# Patient Record
Sex: FEMALE | Race: BLACK OR AFRICAN AMERICAN | URBAN - METROPOLITAN AREA
[De-identification: names, ages, dates, MRNs, and addresses within clinical notes are randomized per-mention and may not be internally consistent; named-entity substitution may affect disease eponyms.]

---

## 2021-12-16 ENCOUNTER — OFFICE VISIT (OUTPATIENT)
Dept: URBAN - METROPOLITAN AREA CLINIC 105 | Facility: CLINIC | Age: 56
End: 2021-12-16
Payer: COMMERCIAL

## 2021-12-16 ENCOUNTER — DASHBOARD ENCOUNTERS (OUTPATIENT)
Age: 56
End: 2021-12-16

## 2021-12-16 ENCOUNTER — WEB ENCOUNTER (OUTPATIENT)
Dept: URBAN - METROPOLITAN AREA CLINIC 105 | Facility: CLINIC | Age: 56
End: 2021-12-16

## 2021-12-16 VITALS
TEMPERATURE: 96.7 F | DIASTOLIC BLOOD PRESSURE: 113 MMHG | SYSTOLIC BLOOD PRESSURE: 178 MMHG | HEIGHT: 64 IN | WEIGHT: 273.2 LBS | HEART RATE: 75 BPM | BODY MASS INDEX: 46.64 KG/M2

## 2021-12-16 DIAGNOSIS — R10.11 RUQ ABDOMINAL PAIN: ICD-10-CM

## 2021-12-16 DIAGNOSIS — K21.00 GASTROESOPHAGEAL REFLUX DISEASE WITH ESOPHAGITIS WITHOUT HEMORRHAGE: ICD-10-CM

## 2021-12-16 DIAGNOSIS — R16.0 HEPATOMEGALY: ICD-10-CM

## 2021-12-16 DIAGNOSIS — Z80.0 FAMILY HISTORY OF CHOLANGIOCARCINOMA: ICD-10-CM

## 2021-12-16 DIAGNOSIS — G47.33 OSA (OBSTRUCTIVE SLEEP APNEA): ICD-10-CM

## 2021-12-16 DIAGNOSIS — Z80.7 FAMILY HISTORY OF MULTIPLE MYELOMA: ICD-10-CM

## 2021-12-16 DIAGNOSIS — E66.01 MORBID OBESITY: ICD-10-CM

## 2021-12-16 DIAGNOSIS — K76.0 FATTY LIVER: ICD-10-CM

## 2021-12-16 DIAGNOSIS — I10 PRIMARY HYPERTENSION: ICD-10-CM

## 2021-12-16 PROBLEM — 197321007: Status: ACTIVE | Noted: 2021-12-16

## 2021-12-16 PROBLEM — 238136002: Status: ACTIVE | Noted: 2021-12-16

## 2021-12-16 PROBLEM — 78275009: Status: ACTIVE | Noted: 2021-12-16

## 2021-12-16 PROBLEM — 266433003: Status: ACTIVE | Noted: 2021-12-16

## 2021-12-16 PROBLEM — 59621000: Status: ACTIVE | Noted: 2021-12-16

## 2021-12-16 PROCEDURE — 99244 OFF/OP CNSLTJ NEW/EST MOD 40: CPT | Performed by: INTERNAL MEDICINE

## 2021-12-16 PROCEDURE — 95801 SLP STDY UNATND W/ANAL: CPT | Performed by: INTERNAL MEDICINE

## 2021-12-16 PROCEDURE — 95800 SLP STDY UNATTENDED: CPT | Performed by: INTERNAL MEDICINE

## 2021-12-16 PROCEDURE — 99204 OFFICE O/P NEW MOD 45 MIN: CPT | Performed by: INTERNAL MEDICINE

## 2021-12-16 PROCEDURE — 95806 SLEEP STUDY UNATT&RESP EFFT: CPT | Performed by: INTERNAL MEDICINE

## 2021-12-16 PROCEDURE — 95805 MULTIPLE SLEEP LATENCY TEST: CPT | Performed by: INTERNAL MEDICINE

## 2021-12-16 RX ORDER — METFORMIN HYDROCHLORIDE 500 MG/1
1 TABLET WITH A MEAL TABLET, FILM COATED ORAL ONCE A DAY
Status: ACTIVE | COMMUNITY

## 2021-12-16 RX ORDER — ATORVASTATIN CALCIUM 10 MG/1
1 TABLET TABLET, FILM COATED ORAL ONCE A DAY
Status: ACTIVE | COMMUNITY

## 2021-12-16 RX ORDER — ASPIRIN 81 MG/1
1 TABLET TABLET, CHEWABLE ORAL ONCE A DAY
Status: ACTIVE | COMMUNITY

## 2021-12-16 RX ORDER — SEMAGLUTIDE 1.34 MG/ML
AS DIRECTED INJECTION, SOLUTION SUBCUTANEOUS
Status: ACTIVE | COMMUNITY

## 2021-12-16 RX ORDER — CETIRIZINE HYDROCHLORIDE 10 MG/1
1 TABLET TABLET, FILM COATED ORAL ONCE A DAY
Status: ACTIVE | COMMUNITY

## 2021-12-16 NOTE — PHYSICAL EXAM GASTROINTESTINAL
Abdomen , soft tenderness in the RuQ with no rebound pain note,  nondistended , no guarding or rigidity , no masses palpable , normal bowel sounds , Liver and Spleen , no hepatomegaly present ,  hepatosplenomegaly , liver nontender , spleen not palpable  , Abdomen , soft, nontender, nondistended , no guarding or rigidity , no masses palpable , normal bowel sounds , Liver and Spleen , no hepatomegaly present , no hepatosplenomegaly , liver nontender , spleen not palpable , Rectal , normal sphincter tone , no internal hemorrhoids, rectal masses or bleeding present

## 2021-12-16 NOTE — HPI-TODAY'S VISIT:
The patient has a history of morbid obestiy, type 2 DM, HTN who presents for an evaluation of RUQ pain and nausea. The pt is s/p colon 2019 which was normal and she notes that she had a mild Mi a few years ago. She notes tht she has regular BM's. The pt has a family history of cholangiocarcinoma and pancreatic cancer. Pt 's father had multiple myeloma and her mother had multiple myeloma.   The patient's consultation note will be sent to the referring MD, Dr. Thais Loyola. and DR. Alyx Schwartz.

## 2021-12-16 NOTE — PHYSICAL EXAM MUSCULOSKELETAL:
normal gait and station , no tenderness or deformities present , normal gait and station , no tenderness or deformities present Yes

## 2021-12-17 LAB — CA 19-9: 23

## 2021-12-20 ENCOUNTER — WEB ENCOUNTER (OUTPATIENT)
Dept: URBAN - METROPOLITAN AREA CLINIC 105 | Facility: CLINIC | Age: 56
End: 2021-12-20

## 2022-04-12 ENCOUNTER — OFFICE VISIT (OUTPATIENT)
Dept: URBAN - METROPOLITAN AREA SURGERY CENTER 16 | Facility: SURGERY CENTER | Age: 57
End: 2022-04-12

## 2022-12-06 NOTE — PHYSICAL EXAM NEUROLOGIC:
oriented to person, place and time , normal sensation , short and long term memory intact , oriented to person, place and time , normal sensation , short and long term memory intact independent